# Patient Record
Sex: MALE | Race: WHITE
[De-identification: names, ages, dates, MRNs, and addresses within clinical notes are randomized per-mention and may not be internally consistent; named-entity substitution may affect disease eponyms.]

---

## 2018-04-01 ENCOUNTER — HOSPITAL ENCOUNTER (OUTPATIENT)
Dept: HOSPITAL 47 - EC | Age: 53
Setting detail: OBSERVATION
LOS: 1 days | Discharge: HOME | End: 2018-04-02
Payer: COMMERCIAL

## 2018-04-01 VITALS — BODY MASS INDEX: 25 KG/M2

## 2018-04-01 DIAGNOSIS — L40.9: ICD-10-CM

## 2018-04-01 DIAGNOSIS — M50.93: ICD-10-CM

## 2018-04-01 DIAGNOSIS — K21.9: ICD-10-CM

## 2018-04-01 DIAGNOSIS — Z86.73: ICD-10-CM

## 2018-04-01 DIAGNOSIS — I10: ICD-10-CM

## 2018-04-01 DIAGNOSIS — F17.210: ICD-10-CM

## 2018-04-01 DIAGNOSIS — Z79.899: ICD-10-CM

## 2018-04-01 DIAGNOSIS — B34.9: Primary | ICD-10-CM

## 2018-04-01 DIAGNOSIS — R00.0: ICD-10-CM

## 2018-04-01 LAB
ALBUMIN SERPL-MCNC: 3.9 G/DL (ref 3.5–5)
ALP SERPL-CCNC: 94 U/L (ref 38–126)
ALT SERPL-CCNC: 18 U/L (ref 21–72)
ANION GAP SERPL CALC-SCNC: 11 MMOL/L
APTT BLD: 25.1 SEC (ref 22–30)
AST SERPL-CCNC: 14 U/L (ref 17–59)
BASOPHILS # BLD AUTO: 0 K/UL (ref 0–0.2)
BASOPHILS NFR BLD AUTO: 0 %
BUN SERPL-SCNC: 11 MG/DL (ref 9–20)
CALCIUM SPEC-MCNC: 9.4 MG/DL (ref 8.4–10.2)
CHLORIDE SERPL-SCNC: 105 MMOL/L (ref 98–107)
CK SERPL-CCNC: 26 U/L (ref 55–170)
CO2 SERPL-SCNC: 25 MMOL/L (ref 22–30)
D DIMER PPP FEU-MCNC: 0.21 MG/L FEU (ref ?–0.6)
EOSINOPHIL # BLD AUTO: 0.1 K/UL (ref 0–0.7)
EOSINOPHIL NFR BLD AUTO: 2 %
ERYTHROCYTE [DISTWIDTH] IN BLOOD BY AUTOMATED COUNT: 5.14 M/UL (ref 4.3–5.9)
ERYTHROCYTE [DISTWIDTH] IN BLOOD: 12.4 % (ref 11.5–15.5)
GLUCOSE CSF-MCNC: 62 MG/DL (ref 40–70)
GLUCOSE SERPL-MCNC: 108 MG/DL (ref 74–99)
HCT VFR BLD AUTO: 45.2 % (ref 39–53)
HGB BLD-MCNC: 16.7 GM/DL (ref 13–17.5)
INR PPP: 1.1 (ref ?–1.2)
LYMPHOCYTES # SPEC AUTO: 1.5 K/UL (ref 1–4.8)
LYMPHOCYTES NFR SPEC AUTO: 24 %
MCH RBC QN AUTO: 32.4 PG (ref 25–35)
MCHC RBC AUTO-ENTMCNC: 36.8 G/DL (ref 31–37)
MCV RBC AUTO: 88 FL (ref 80–100)
MONOCYTES # BLD AUTO: 0.6 K/UL (ref 0–1)
MONOCYTES NFR BLD AUTO: 9 %
NEUTROPHILS # BLD AUTO: 3.9 K/UL (ref 1.3–7.7)
NEUTROPHILS NFR BLD AUTO: 63 %
NUC CELL # CSF: 2 U/L (ref 0–5)
PH UR: 6.5 [PH] (ref 5–8)
PLATELET # BLD AUTO: 184 K/UL (ref 150–450)
POTASSIUM SERPL-SCNC: 3.6 MMOL/L (ref 3.5–5.1)
PROT SERPL-MCNC: 6.7 G/DL (ref 6.3–8.2)
PT BLD: 10.4 SEC (ref 9–12)
RBC # CSF: 1 U/L (ref 0–10)
SODIUM SERPL-SCNC: 141 MMOL/L (ref 137–145)
SP GR UR: 1.01 (ref 1–1.03)
SPECIMEN VOL CSF: 2 ML
TROPONIN I SERPL-MCNC: <0.012 NG/ML (ref 0–0.03)
UROBILINOGEN UR QL STRIP: <2 MG/DL (ref ?–2)
WBC # BLD AUTO: 6.3 K/UL (ref 3.8–10.6)

## 2018-04-01 PROCEDURE — 62270 DX LMBR SPI PNXR: CPT

## 2018-04-01 PROCEDURE — 85730 THROMBOPLASTIN TIME PARTIAL: CPT

## 2018-04-01 PROCEDURE — 85025 COMPLETE CBC W/AUTO DIFF WBC: CPT

## 2018-04-01 PROCEDURE — 85610 PROTHROMBIN TIME: CPT

## 2018-04-01 PROCEDURE — 87070 CULTURE OTHR SPECIMN AEROBIC: CPT

## 2018-04-01 PROCEDURE — 87205 SMEAR GRAM STAIN: CPT

## 2018-04-01 PROCEDURE — 84157 ASSAY OF PROTEIN OTHER: CPT

## 2018-04-01 PROCEDURE — 82553 CREATINE MB FRACTION: CPT

## 2018-04-01 PROCEDURE — 80053 COMPREHEN METABOLIC PANEL: CPT

## 2018-04-01 PROCEDURE — 36415 COLL VENOUS BLD VENIPUNCTURE: CPT

## 2018-04-01 PROCEDURE — 89050 BODY FLUID CELL COUNT: CPT

## 2018-04-01 PROCEDURE — 82550 ASSAY OF CK (CPK): CPT

## 2018-04-01 PROCEDURE — 99285 EMERGENCY DEPT VISIT HI MDM: CPT

## 2018-04-01 PROCEDURE — 81003 URINALYSIS AUTO W/O SCOPE: CPT

## 2018-04-01 PROCEDURE — 96365 THER/PROPH/DIAG IV INF INIT: CPT

## 2018-04-01 PROCEDURE — 70450 CT HEAD/BRAIN W/O DYE: CPT

## 2018-04-01 PROCEDURE — 93005 ELECTROCARDIOGRAM TRACING: CPT

## 2018-04-01 PROCEDURE — 84484 ASSAY OF TROPONIN QUANT: CPT

## 2018-04-01 PROCEDURE — 96366 THER/PROPH/DIAG IV INF ADDON: CPT

## 2018-04-01 PROCEDURE — 85379 FIBRIN DEGRADATION QUANT: CPT

## 2018-04-01 PROCEDURE — 96361 HYDRATE IV INFUSION ADD-ON: CPT

## 2018-04-01 PROCEDURE — 82945 GLUCOSE OTHER FLUID: CPT

## 2018-04-01 PROCEDURE — 87040 BLOOD CULTURE FOR BACTERIA: CPT

## 2018-04-01 PROCEDURE — 87502 INFLUENZA DNA AMP PROBE: CPT

## 2018-04-01 PROCEDURE — 96375 TX/PRO/DX INJ NEW DRUG ADDON: CPT

## 2018-04-01 PROCEDURE — 71046 X-RAY EXAM CHEST 2 VIEWS: CPT

## 2018-04-01 RX ADMIN — IBUPROFEN PRN MG: 600 TABLET ORAL at 22:25

## 2018-04-01 RX ADMIN — ATENOLOL SCH MG: 25 TABLET ORAL at 22:26

## 2018-04-01 RX ADMIN — PANTOPRAZOLE SODIUM SCH: 40 TABLET, DELAYED RELEASE ORAL at 19:08

## 2018-04-01 NOTE — P.CNNES
History of Present Illness


Consult date: 04/01/18


Reason for Consult: Patient admitted with headaches and neck pain.


History of Present Illness: 





This patient is a 52-year-old right-handed white male who was brought into the 

emergency room today for evaluation of shortness of breath and body aches.  

Patient apparently over the past 2 weeks has been complaining of increasing 

headache and neck pain symptoms.  He was brought into the emergency room today 

and was evaluated in the ER by Dr. Contreras.  He was sent for a computed 

tomography scan of the brain for further evaluation of this headache symptoms.  

CAT scan of the brain revealed no acute intracranial abnormality.  There was 

incidental finding of a arachnoid cyst in the left middle cranial fossa as well 

as a second arachnoid cyst in the posterior midline cerebellum region.  No 

acute stroke or hemorrhage was noted.  The computed tomography scan of the 

brain did reveal evidence of moderate chronic ethmoid sinus disease.  The 

patient continued to complain of neck pain and headache symptoms mostly 

involving the bifrontal region.  He denied any previous history of neck injury 

or arthritis involving the cervical spine.  For this reason it was decided to 

attempt a lumbar puncture in the ER by Dr. Contreras.  He was unsuccessful.  

Anesthesia was consulted for assistance and they were able to perform a lumbar 

puncture today successfully.  8 mL's of clear colorless spinal fluid was sent 

for analysis.  His spinal fluid results indicated clear colorless fluid with 

RBC count of 1.0 and WBC count of 2.0.  CSF glucose was 62 and CSF total 

protein was 37.  The spinal fluid was negative for any evidence of acute 

bacterial or viral meningitis.  It was recommended the patient to be admitted 

to the hospital for further evaluation of his current symptoms.  He did also 

have evidence of tachycardia in the emergency room with shortness of breath.  

He was started empirically on some antibiotics by the ER physician Dr. Contreras.  

According to the patient he has been having symptoms of a bad cold and flulike 

symptoms for over 2 weeks.  This has been ongoing however there is been no 

evidence of febrile illness associated with these events.  He was evaluated for 

influenza A and influenza B in the emergency room and these test did come back 

negative.  Patient is a  and states he does have some neck pain 

which usually responds to chiropractic therapy.  Apparently recently the 

symptoms have been not as responsive to any manipulation of the neck.  Patient 

denies any neck injury in the past.  We suggest he have a computed tomography 

scan of the cervical spine for further evaluation of cervical disc disease.  

The patient otherwise seems to be doing better after completion of his spinal 

tap earlier today.  As noted the spinal fluid was negative for any evidence of 

acute bacterial or viral meningitis.  His symptoms seem to be more related to 

possible sinusitis.  He is being evaluated in the ENT clinic in the next 2 

weeks.  Patient is now admitted and neurology has been consulted for further 

evaluation and recommendations.





Review of Systems


Constitutional: Denies chills, Denies fever


Eyes: denies blurred vision, denies pain


Ears, nose, mouth and throat: Reports post-nasal drip, Reports sinus pain, 

Reports sinus pressure, Reports vertigo, Denies headache, Denies sore throat


Cardiovascular: Denies chest pain, Denies shortness of breath


Respiratory: Denies cough


Gastrointestinal: Denies abdominal pain, Denies diarrhea, Denies nausea, Denies 

vomiting


Musculoskeletal: Denies myalgias


Integumentary: Denies pruritus, Denies rash


Neurological: Reports headaches, Reports vertigo, Denies numbness, Denies 

weakness


Psychiatric: Denies anxiety, Denies depression


Endocrine: Denies fatigue, Denies weight change





Past Medical History


Past Medical History: Hypertension


Additional Past Medical History / Comment(s): psoriasis


History of Any Multi-Drug Resistant Organisms: None Reported


Past Surgical History: No Surgical Hx Reported


Past Psychological History: No Psychological Hx Reported


Smoking Status: Current every day smoker


Past Alcohol Use History: Occasional


Past Drug Use History: None Reported





Medications and Allergies


 Home Medications











 Medication  Instructions  Recorded  Confirmed  Type


 


Adalimumab [Humira Crohn's] 40 mg SQ Q30D 04/01/18 04/01/18 History


 


Atenolol [Tenormin] 25 mg PO DAILY 04/01/18 04/01/18 History


 


Doxazosin [Cardura] 2 mg PO DAILY 04/01/18 04/01/18 History


 


Omeprazole [PriLOSEC] 20 mg PO BID 04/01/18 04/01/18 History











 Allergies











Allergy/AdvReac Type Severity Reaction Status Date / Time


 


No Known Allergies Allergy   Verified 04/01/18 11:23














Physical Examination





- Vital Signs


Vital Signs: 


 Vital Signs











  Temp Pulse Resp BP Pulse Ox


 


 04/01/18 17:28  98.1 F  97  18  154/91  96


 


 04/01/18 16:00   106 H  18  128/84  95


 


 04/01/18 15:51  98.1 F  106 H  18  124/85  96


 


 04/01/18 15:19   98  16  134/90  98


 


 04/01/18 14:15   110 H  16  158/106  96


 


 04/01/18 12:13   99  16  168/107  98


 


 04/01/18 11:05  97.6 F  131 H  20  134/87  95








 Intake and Output











 04/01/18 04/01/18 04/01/18





 06:59 14:59 22:59


 


Other:   


 


  Weight  74.843 kg 














- Constitutional


General appearance: average body habitus, cooperative





- EENT


EENT: PERRL, mucous membranes moist





- Respiratory


Respiratory: lungs clear, normal breath sounds





- Cardiovascular


Cardiovascular: regular rate, normal S1, normal S2


Extremities: no peripheral edema bilaterally





- Gastrointestinal


Gastrointestinal: normoactive bowel sounds





- Integumentary


Integumentary: normal





- Neurologic


Cranial nerve examination: PERRL, EOMI, VFF, V1/V2/V3 grossly intact, face 

symmetric, tongue midline, intact gag reflex, intact corneal reflex, normal 

palatal elevation


Speech examination: intact


Detailed motor examination: grossly full strength in all extremities


Motor examination - right side: 4/5: biceps, triceps, wrist flexion, wrist 

extension, , hip flexors, knee extensors, dorsiflexion, toe extension (EHL)

, plantarflexion


Motor examination - left side: 4/5: biceps, triceps, wrist flexion, wrist 

extension, , hip flexors, knee extensors, dorsiflexion, toe extension (EHL)

, plantarflexion


Detailed sensory examination: intact


Reflex and gait examination: intact


Reflexes: 1+: ankle, bicep, knee, tricep





- Musculoskeletal


Musculoskeletal: no pain





- Psychiatric


Psychiatric: mood/affect appropriate, cooperative





Results





- Laboratory Findings


CBC and BMP: 


 04/01/18 11:44





 04/01/18 11:44


Abnormal Lab Findings: 


 Abnormal Labs











  04/01/18 04/01/18





  11:44 11:44


 


Glucose   108 H


 


AST   14 L


 


ALT   18 L


 


Total Creatine Kinase  26 L 














Assessment and Plan


(1) Headache


Current Visit: Yes   Status: Acute   Code(s): R51 - HEADACHE   SNOMED Code(s): 

09356197


   





(2) Neck stiffness


Current Visit: Yes   Status: Acute   Code(s): M43.6 - TORTICOLLIS   SNOMED Code(

s): 837413295


   





(3) Cervical disc disease


Current Visit: Yes   Status: Acute   Code(s): M50.90 - CERVICAL DISC DISORDER, 

UNSP, UNSPECIFIED CERVICAL REGION   SNOMED Code(s): 202141866


   





(4) Tachycardia


Current Visit: Yes   Status: Acute   Code(s): R00.0 - TACHYCARDIA, UNSPECIFIED 

  SNOMED Code(s): 4260877


   


Plan: 





This patient is a 52-year-old male who was admitted to the hospital today with 

symptoms of headache and neck pain.  Patient was brought into the emergency 

room today and was seen in the ER by Dr. Contreras.  He was complaining of two-week 

history of neck pain and headaches.  Patient underwent computed tomography scan 

of the brain which revealed no acute intracranial abnormality.  There was 

evidence of 2 arachnoid cyst which appeared to be normal variance.  There was 

moderate degree of chronic ethmoid sinusitis noted.  The patient was seen by 

Dr. Contreras who attempted a lumbar puncture for the patient.  He was 

unsuccessful.  Anesthesia was contacted and spinal fluid was obtained.  Results 

of the spinal fluid were reviewed today with the patient and are negative for 

any evidence for acute bacterial or viral meningitis.  The patient has been 

complaining of mild nasal congestion and sinus type symptoms.  CAT scan does 

show evidence of ethmoid sinusitis.  Patient is awaiting to be seen in the ENT 

clinic in 2 weeks.  He was given empiric dose of antibiotics in the emergency 

room and admitted to Hospital.  His neurological examination at this time is 

nonfocal.  We have recommended he undergo a computed tomography scan of the 

neck for further evaluation of cervical disc disease.  He does not have any 

evidence for cervical radiculopathy at this time.  Patient is also being 

evaluated for sinus tachycardia.  We will continue close neurological follow-up 

with this patient during this admission.  We have reviewed the results of his 

computed tomography scan of the brain and spinal fluid analysis with him in 

detail today.  His overall prognosis remains guarded.


Time with Patient: Greater than 30

## 2018-04-01 NOTE — P.PN
Progress Note - Text


Anesthesia was consulted to perform a lumbar puncture by Dr. Contreras, from the 

emergency room department.  The emergency room physician had attempted to 

perform an LP prior today without success.  The patient is incurring [headache 

and neck stiffness.  The LP was requested to ascertain if there is any 

infectious process occurring in the patient's central nervous system. ].  The 

chart was reviewed.  The procedure, the risks and benefits of the procedure, 

were explained to the patient.  The patient then agreed to the procedure and 

informed consent was obtained.





Procedure: The patient was placed in the sitting position.  The low back was 

then sterilely prepped and draped.  Then [1 ]mL of lidocaine 1% was injected 

subcutaneously at the L3-L4 interspace.  Then a 22-gauge quinke spinal needle 

was placed into the subarachnoid space at L3-L4 on the second attempt without 

difficulty.  Then approximately [8 ]mL's of clear colorless spinal fluid was 

obtained in 4 tubes. [ 2 mL's]/tube.  The patient tolerated the procedure well.

  There were no complications.  Instructions were then given to the patient.  

Patient was to be at bed rest for the next 2 hours.  Patient is encouraged to 

increase oral fluid,if tolerated.  Patient may take previously prescribed pain 

meds if needed.

## 2018-04-01 NOTE — XR
EXAMINATION TYPE: XR chest 2V

 

DATE OF EXAM: 4/1/2018

 

HISTORY: difficulty breathing.

 

REFERENCE: NONE.

 

FINDINGS: Lung volumes are mildly prominent. There is minimal atelectasis at the left lung base. The 
heart is not enlarged. Pleural spaces are clear.

 

IMPRESSION: 

1. MINIMAL, PLATELIKE ATELECTASIS, LEFT LUNG BASE.

2. PLEASE CORRELATE FOR COPD.

## 2018-04-01 NOTE — ED
SOB HPI





- General


Chief Complaint: Shortness of Breath


Stated Complaint: POSS PNEUMONIA


Time Seen by Provider: 04/01/18 11:18


Source: patient, family


Mode of arrival: ambulatory


Limitations: no limitations





- History of Present Illness


Initial Comments: 


52 years old gentleman presents with the body aches, shortness of breath, pain 

with deep breaths, complaining about headache and neck pain for about 2 weeks 

now he denies any trauma also complains about nausea and headache frontal 

sinuses, he denies any history of arthritis in the neck noticed that heart rate 

was faster he noticed some palpitation or abdominal pain no frequency urgency 

dysuria he has been feeling dizzy


History of TIA and no symptoms consistent with a TIA or CVA





- Related Data


 Home Medications











 Medication  Instructions  Recorded  Confirmed


 


Adalimumab [Humira Crohn's] 40 mg SQ Q30D 04/01/18 04/01/18


 


Atenolol [Tenormin] 25 mg PO DAILY 04/01/18 04/01/18


 


Doxazosin [Cardura] 2 mg PO DAILY 04/01/18 04/01/18


 


Omeprazole [PriLOSEC] 20 mg PO BID 04/01/18 04/01/18











 Allergies











Allergy/AdvReac Type Severity Reaction Status Date / Time


 


No Known Allergies Allergy   Verified 04/01/18 11:23














Review of Systems


ROS Statement: 


Those systems with pertinent positive or pertinent negative responses have been 

documented in the HPI.





ROS Other: All systems not noted in ROS Statement are negative.





Past Medical History


Past Medical History: Hypertension


Additional Past Medical History / Comment(s): psoriasis


History of Any Multi-Drug Resistant Organisms: None Reported


Past Surgical History: No Surgical Hx Reported


Past Psychological History: No Psychological Hx Reported


Smoking Status: Current every day smoker


Past Alcohol Use History: Occasional


Past Drug Use History: None Reported





General Exam





- General Exam Comments


Initial Comments: 


General:  The patient is awake and alert, in no distress, and does not appear 

acutely ill.  GCS is 15


Skin:  Skin is warm and dry and no rashes or lesions are noted. 


Eye:  Pupils are equal, round and reactive to light, extra-ocular movements are 

intact; there is normal conjunctiva bilaterally.  


Ears, nose, mouth and throat: Tender over frontal sinuses bilaterally. 


Neck:  The neck is supple, there is no tenderness  or JVD.  


Cardiovascular:  There is a regular rate and rhythm. No murmur, rub or gallop 

is appreciated.


Respiratory: To auscultation bilateral, no wheezing no rhonchi no distress  

respiratory wise noticed


Gastrointestinal:  Soft, non-distended, non-tender abdomen without masses or 

organomegaly noted. There is no rebound or guarding present. Bowel sounds are 

unremarkable. 


Back:  There is no tenderness to palpation in the midline. There is no obvious 

deformity.


Musculoskeletal:  Normal ROM, no tenderness, There is no pedal edema. There is 

no calf tenderness or swelling. No cords were appreciated.  


Neurological:  CN II-XII intact, Cranial nerves III through XII are intact. 

There are no obvious motor or sensory deficits. Coordination appears grossly 

intact. Speech is normal.


Psychiatric:  Cooperative, appropriate mood & affect, normal judgment.  








Limitations: no limitations





Course


 Vital Signs











  04/01/18 04/01/18 04/01/18





  11:05 12:13 14:15


 


Temperature 97.6 F  


 


Pulse Rate 131 H 99 110 H


 


Respiratory 20 16 16





Rate   


 


Blood Pressure 134/87 168/107 158/106


 


O2 Sat by Pulse 95 98 96





Oximetry   














  04/01/18 04/01/18





  15:19 15:51


 


Temperature  98.1 F


 


Pulse Rate 98 106 H


 


Respiratory 16 18





Rate  


 


Blood Pressure 134/90 124/85


 


O2 Sat by Pulse 98 96





Oximetry  








Patient is reassessed a few times, his CBC, comprehensive metabolic panel, 

troponin, d-dimer, chest x-ray are unremarkable he continued to have a headache 

and neck neck stiffness and dizziness considering his cough and shortness of 

breath was wondering about pneumonia but chest x-ray being clear)


Head CT and considering is a neck stiffness and her do a lumbar puncture.  I 

noticed he was quite tachycardic on arrival his heart rate was around 1:30 he 

does some fluids though there is no clear dehydration visible on his blood work 

and he has no diarrhea hasn't no vomiting his oral intake has been good








EKG is a sinus tachycardia ventricular rate of the time EKG was done 103 DE 

interval is 202 QRS duration is 90 QT/QTc is 352/461 review of this EKG does 

not reveal any ST elevation or ST depression,


Head CT was reviewed, noticed no acute intracranial abnormality, incidental 2.4 

cm arachnoid cyst anterior left middle cranial fossa a second arachnoid cyst 

versus normal variant Megace cisterna magna posterior midline cerebellum 

measures 2.7 cm also noticed some moderate chronic ethmoid sinus disease





- Reevaluation(s)


Reevaluation #1: 


Syrinx patient's headache neck stiffness and dizziness head CT was done which 

is unremarkable he does have an arachnoid cyst time to LP was attempted, I was 

not able to get any fluids because Turning into the bones may be secondary to 

degenerative disease in the lower back or malposition, patient be admitted to 

Dr. Allen's service for further evaluation and management of his tachycardia 

headache will consult neurology for his headache blood cultures and urine 

cultures have been obtained and will continue on a headache him on empiric 

antibiotics and IV fluids


04/01/18 15:54








Medical Decision Making





- Lab Data


Result diagrams: 


 04/01/18 11:44





 04/01/18 11:44


 Lab Results











  04/01/18 04/01/18 04/01/18 Range/Units





  11:44 11:44 11:44 


 


WBC   6.3   (3.8-10.6)  k/uL


 


RBC   5.14   (4.30-5.90)  m/uL


 


Hgb   16.7   (13.0-17.5)  gm/dL


 


Hct   45.2   (39.0-53.0)  %


 


MCV   88.0   (80.0-100.0)  fL


 


MCH   32.4   (25.0-35.0)  pg


 


MCHC   36.8   (31.0-37.0)  g/dL


 


RDW   12.4   (11.5-15.5)  %


 


Plt Count   184   (150-450)  k/uL


 


Neutrophils %   63   %


 


Lymphocytes %   24   %


 


Monocytes %   9   %


 


Eosinophils %   2   %


 


Basophils %   0   %


 


Neutrophils #   3.9   (1.3-7.7)  k/uL


 


Lymphocytes #   1.5   (1.0-4.8)  k/uL


 


Monocytes #   0.6   (0-1.0)  k/uL


 


Eosinophils #   0.1   (0-0.7)  k/uL


 


Basophils #   0.0   (0-0.2)  k/uL


 


PT     (9.0-12.0)  sec


 


INR     (<1.2)  


 


APTT     (22.0-30.0)  sec


 


D-Dimer     (<0.60)  mg/L FEU


 


Sodium    141  (137-145)  mmol/L


 


Potassium    3.6  (3.5-5.1)  mmol/L


 


Chloride    105  ()  mmol/L


 


Carbon Dioxide    25  (22-30)  mmol/L


 


Anion Gap    11  mmol/L


 


BUN    11  (9-20)  mg/dL


 


Creatinine    0.99  (0.66-1.25)  mg/dL


 


Est GFR (CKD-EPI)AfAm    >90  (>60 ml/min/1.73 sqM)  


 


Est GFR (CKD-EPI)NonAf    86  (>60 ml/min/1.73 sqM)  


 


Glucose    108 H  (74-99)  mg/dL


 


Calcium    9.4  (8.4-10.2)  mg/dL


 


Total Bilirubin    0.7  (0.2-1.3)  mg/dL


 


AST    14 L  (17-59)  U/L


 


ALT    18 L  (21-72)  U/L


 


Alkaline Phosphatase    94  ()  U/L


 


Total Creatine Kinase  26 L    ()  U/L


 


CK-MB (CK-2)  <0.2    (0.0-2.4)  ng/mL


 


CK-MB (CK-2) Rel Index      


 


Troponin I  <0.012    (0.000-0.034)  ng/mL


 


Total Protein    6.7  (6.3-8.2)  g/dL


 


Albumin    3.9  (3.5-5.0)  g/dL


 


Urine Color     


 


Urine Appearance     (Clear)  


 


Urine pH     (5.0-8.0)  


 


Ur Specific Gravity     (1.001-1.035)  


 


Urine Protein     (Negative)  


 


Urine Glucose (UA)     (Negative)  


 


Urine Ketones     (Negative)  


 


Urine Blood     (Negative)  


 


Urine Nitrite     (Negative)  


 


Urine Bilirubin     (Negative)  


 


Urine Urobilinogen     (<2.0)  mg/dL


 


Ur Leukocyte Esterase     (Negative)  


 


Influenza Type A RNA     (Not Detectd)  


 


Influenza Type B (PCR)     (Not Detectd)  














  04/01/18 04/01/18 04/01/18 Range/Units





  11:44 11:44 12:50 


 


WBC     (3.8-10.6)  k/uL


 


RBC     (4.30-5.90)  m/uL


 


Hgb     (13.0-17.5)  gm/dL


 


Hct     (39.0-53.0)  %


 


MCV     (80.0-100.0)  fL


 


MCH     (25.0-35.0)  pg


 


MCHC     (31.0-37.0)  g/dL


 


RDW     (11.5-15.5)  %


 


Plt Count     (150-450)  k/uL


 


Neutrophils %     %


 


Lymphocytes %     %


 


Monocytes %     %


 


Eosinophils %     %


 


Basophils %     %


 


Neutrophils #     (1.3-7.7)  k/uL


 


Lymphocytes #     (1.0-4.8)  k/uL


 


Monocytes #     (0-1.0)  k/uL


 


Eosinophils #     (0-0.7)  k/uL


 


Basophils #     (0-0.2)  k/uL


 


PT  10.4    (9.0-12.0)  sec


 


INR  1.1    (<1.2)  


 


APTT  25.1    (22.0-30.0)  sec


 


D-Dimer  0.21    (<0.60)  mg/L FEU


 


Sodium     (137-145)  mmol/L


 


Potassium     (3.5-5.1)  mmol/L


 


Chloride     ()  mmol/L


 


Carbon Dioxide     (22-30)  mmol/L


 


Anion Gap     mmol/L


 


BUN     (9-20)  mg/dL


 


Creatinine     (0.66-1.25)  mg/dL


 


Est GFR (CKD-EPI)AfAm     (>60 ml/min/1.73 sqM)  


 


Est GFR (CKD-EPI)NonAf     (>60 ml/min/1.73 sqM)  


 


Glucose     (74-99)  mg/dL


 


Calcium     (8.4-10.2)  mg/dL


 


Total Bilirubin     (0.2-1.3)  mg/dL


 


AST     (17-59)  U/L


 


ALT     (21-72)  U/L


 


Alkaline Phosphatase     ()  U/L


 


Total Creatine Kinase     ()  U/L


 


CK-MB (CK-2)     (0.0-2.4)  ng/mL


 


CK-MB (CK-2) Rel Index     


 


Troponin I     (0.000-0.034)  ng/mL


 


Total Protein     (6.3-8.2)  g/dL


 


Albumin     (3.5-5.0)  g/dL


 


Urine Color    Light Yellow  


 


Urine Appearance    Clear  (Clear)  


 


Urine pH    6.5  (5.0-8.0)  


 


Ur Specific Gravity    1.008  (1.001-1.035)  


 


Urine Protein    Negative  (Negative)  


 


Urine Glucose (UA)    Negative  (Negative)  


 


Urine Ketones    Negative  (Negative)  


 


Urine Blood    Negative  (Negative)  


 


Urine Nitrite    Negative  (Negative)  


 


Urine Bilirubin    Negative  (Negative)  


 


Urine Urobilinogen    <2.0  (<2.0)  mg/dL


 


Ur Leukocyte Esterase    Negative  (Negative)  


 


Influenza Type A RNA   Not Detected   (Not Detectd)  


 


Influenza Type B (PCR)   Not Detected   (Not Detectd)  














Disposition


Clinical Impression: 


 Tachycardia, Headache, Neck stiffness, Weakness





Disposition: ADMITTED AS IP TO THIS [facility]


Condition: Good


Referrals: 


Yana George DO [Primary Care Provider] - 1-2 days

## 2018-04-01 NOTE — CT
EXAMINATION TYPE: CT brain wo con

 

DATE OF EXAM: 4/1/2018

 

COMPARISON: NONE

 

HISTORY: 52-year-old male with headache and dizziness.

 

TECHNIQUE:  Examination was done in axial plane without intravenous contrast.  Coronal and sagittal r
econstructions performed.

 

CT DLP: 1079 mGycm

Automated exposure control for dose reduction was used.

 

FINDINGS:

There is no evidence of  acute intracranial hemorrhage, acute ischemic changes, mass-effect, or extra
-axial fluid collection. 

 

Prominent CSF space compatible with an arachnoid cyst along the anterior left middle cranial fossa me
asuring 3.4 x 1.9 cm. Second prominent CSF space along the posterior midline of the posterior cranial
 fossa measures 2.7 x 2.3 cm and could could represent a meredith cisterna magna or second arachnoid cyst
.

 

 There is no effacement of cerebral sulci or basal subarachnoid cisterns.  There is no hydrocephalus.
  There is no midline shift.  Gray-white matter distinction is preserved.

 

Moderate mucosal thickening throughout the ethmoid air cells. Mastoid air cells well pneumatized. Orb
its and globes appear intact.

 

 

IMPRESSION:

 

1. No acute intracranial abnormality seen.

2. Incidental 3.4 cm arachnoid cyst anterior left middle cranial fossa. A second arachnoid cyst versu
s normal variant megacisterna magna posterior midline cerebellum measuring 2.7 cm.

3. Moderate chronic ethmoid sinus disease.

## 2018-04-02 VITALS — HEART RATE: 89 BPM | RESPIRATION RATE: 17 BRPM

## 2018-04-02 VITALS — DIASTOLIC BLOOD PRESSURE: 84 MMHG | TEMPERATURE: 97.7 F | SYSTOLIC BLOOD PRESSURE: 128 MMHG

## 2018-04-02 RX ADMIN — IBUPROFEN PRN MG: 600 TABLET ORAL at 06:35

## 2018-04-02 RX ADMIN — PANTOPRAZOLE SODIUM SCH MG: 40 TABLET, DELAYED RELEASE ORAL at 06:35

## 2018-04-02 RX ADMIN — ATENOLOL SCH MG: 25 TABLET ORAL at 08:36

## 2018-04-02 NOTE — CONS
CONSULTATION



DATE OF SERVICE:

04/02/2018



REASON FOR CONSULTATION:

Antibiotic recommendation.



HISTORY OF PRESENT ILLNESS:

The patient is a 52-year-old  male presenting to the ER at [facility] with the chief complaints of generalized body aches, nasal congestion and some

headache. His symptoms have been going on since Monday. The patient says that he went

to see his primary care physician for a regular checkup. Headache intensity has been

mild to moderate.  No associated photophobia or vomiting.  No chest pain.  Minimal

shortness of breath with associated mild cough but not bringing up any sputum.  No

abdominal pain.  No diarrhea.  With these symptoms, the patient was evaluated by the ER

physician. The patient did have a CT of the brain that was negative for any bleed.

Chest x-ray report showed minimal plate-like atelectasis, left lung base, and COPD. The

patient had no fever during this admission.  The patient's white count is normal at

6.3.  His liver enzymes are on the low side.  His urine has been negative.  The patient

did have a CSF examination which shows normal glucose, protein. White count was only 2.

Influenza A and B have been negative.  I was asked to see the patient for further

recommendation regarding antibiotic therapy.



REVIEW OF SYSTEMS:

CONSTITUTIONAL: Positive for weakness. No high-grade fever.

EYES: No complaint.

ENT: As per HPI.

RESPIRATORY: As per HPI.

CARDIOVASCULAR: No complaint.

GENITOURINARY: No complaint.

GASTROINTESTINAL: No complaint.

MUSCULOSKELETAL:  No complaint.

INTEGUMENTARY: No complaint.

PSYCHOLOGICAL: No complaint.

ENDOCRINE: No complaint.

NEUROLOGICAL: Headache, as mentioned above. No other neurological symptoms.



PAST MEDICAL HISTORY:

Significant for psoriasis and hypertension.



PAST SURGICAL HISTORY:

No major surgeries.



SOCIAL HISTORY:

Current everyday smoker; smokes about a pack a day. Occasionally drinks. No drug use.



FAMILY HISTORY:

No pertinent findings noticed.



ALLERGIES:

NO KNOWN DRUG ALLERGIES.



CURRENT MEDICATIONS:

1. Tenormin.

2. Cardura.

3. Motrin.

4. Narcan.

5. Protonix.



PHYSICAL EXAMINATION:

Blood pressure is 128/84 with a pulse of 78, temperature 97.7. He is 98% on room air.

General description is a middle-aged male lying in bed in no distress.  No tachypnea or

accessory muscle of respiration use.

HEENT examination shows no pallor or scleral icterus.  Oral mucosa membrane is moist.

Minimal _____ erythema. No thrush.

NECK: Trachea is central. No thyromegaly.

LUNGS: Unlabored breathing. Decreased breath sounds at the bases.  No wheeze or

crackle.

HEART: S1, S2.  Regular rate and rhythm.

ABDOMEN:  Soft. No tenderness.  No guarding or rigidity.  No organomegaly.

EXTREMITIES:  No edema of feet.

SKIN EXAMINATION: No rash or mass palpable.

Neurologically patient is awake, alert and oriented x3, with no signs of meningeal

irritation.



LABS:

Hemoglobin is 16.7, white count 6.3 with no left shift.  BUN of 11, creatinine 0.99.

Electrolytes have been normal. Liver enzymes are normal. UA has been negative. _____

has been negative.



DIAGNOSTIC IMPRESSION AND PLAN:

Patient admitted to hospital with generalized body aches, some nasal congestion,

minimal cough, more likely pointing towards a viral syndrome.  Clinically doubt a

bacterial infection in a patient who did have absence of any fever and no elevated

white count.  He did have extensive workup, including UA that was negative,  chest x-

ray report negative for pneumonia; even the lumbar puncture CSF examination now

pointing towards a bacterial or viral infection.



PLAN:

1. No need for any systemic antibiotic therapy at this point. This was explained in

    detail to the patient and his wife, and all their questions were answered. Also

    discussed with the nurse practitioner for the admitting team.

2. Patient may benefit from some nasal decongestant and Tylenol for his underlying

    sinus congestion symptoms.





MMODL / IJN: [id number] / Job#: [id number]

## 2018-04-03 NOTE — P.HPIM
History of Present Illness


H&P Date: 04/02/18


Chief Complaint: Headache





HISTORY AND PHYSICAL AND DISCHARGE SUMMARY: 








This is a 52-year-old  male patient of Dr. Lomeli with past medical 

history for hypertension and psoriasis on Humira, gastroesophageal reflux 

disease.


Patient presented to [facility] emergency center due to 

shortness of breath and pain with deep breathing along with headache and neck 

pain for 2 weeks.  No trauma to the area.  He also had nausea and headache to 

the frontal sinus areas.  He also noted that he was having palpitations.  He 

was found to be tachycardic running between 98 and 131.  He was afebrile.  

Blood pressure was high at 168/107.  His laboratory studies were essentially 

normal including a normal troponin is 0.012.  D-dimer was normal, influenza 

testing was negative and urinalysis was negative.  EKG was a sinus tachycardia 

with no acute ST changes.  Chest x-ray showed minimal platelike atelectasis in 

the left lung base.  Correlate for COPD.  CT of the brain showed no acute 

intracranial abnormality.  Incidental arachnoid cyst in the anterior left 

middle cranial fossa.  A second arachnoid cyst versus normal variant 

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 

chronic ethmoid sinus disease.  Patient was seen by anesthesia and underwent a 

lumbar puncture.  He was seen by neurology, Dr. Abreu with recommendations to 

follow-up with ENT clinic as was previously planned and he also recommended a 

CAT scan of the neck for further evaluation of cervical disc disease which can 

be done as an outpatient.  He was also seen by Dr. Contreras from infectious 

disease with no need for any antibiotics.  Spinal fluid was negative for 

infection and Gram stain showed no organisms, no PMNs.  Blood culture showed no 

growth at 24 hours. Patient's blood pressure improved and he remained afebrile.

  Patient was very anxious to be discharged and he was discharged home today in 

stable condition.





Review of Systems


Constitutional: Denies chills, Denies fever, Denies poor appetite


Ears, nose, mouth and throat: Reports headache, Reports nasal congestion, 

Denies dental pain, Denies dysphagia, Denies hoarseness, Denies mouth pain, 

Denies swelling in mouth, Denies swelling in throat, Denies sore throat


Cardiovascular: Reports chest pain, Reports shortness of breath, Denies 

decreased exercise tolerance, Denies dyspnea on exertion, Denies edema, Denies 

leg edema, Denies lightheadedness, Denies orthopnea, Denies syncope


Respiratory: Reports cough, Reports dyspnea, Denies congestion, Denies cough 

with sputum, Denies excessive sputum, Denies hemoptysis, Denies home oxygen, 

Denies wheezing


Gastrointestinal: Denies abdominal pain, Denies diarrhea, Denies loss of 

appetite, Denies melena, Denies nausea, Denies vomiting


Genitourinary: Denies dysuria


Musculoskeletal: Reports neck pain, Denies frequent falls, Denies gait 

dysfunction


Integumentary: Denies wounds


Neurological: Denies change in speech, Denies confusion, Denies gait dysfunction

, Denies loss of vision, Denies seizures





Past Medical History


Past Medical History: GERD/Reflux, Hypertension


Additional Past Medical History / Comment(s): psoriasis


History of Any Multi-Drug Resistant Organisms: None Reported


Past Surgical History: No Surgical Hx Reported


Past Psychological History: No Psychological Hx Reported


Smoking Status: Current every day smoker


Past Alcohol Use History: Occasional


Additional Past Alcohol Use History / Comment(s): Patient is smoker of less 

than one pack per day for 30 years.  He denies any medical marijuana, street 

drug or alcohol use.  He lives at home with his wife.


Past Drug Use History: None Reported





- Past Family History


  ** Father


Additional Family Medical History / Comment(s): Patient denies any family 

history of heart disease, diabetes, strokes, cancers.





Medications and Allergies


 Home Medications











 Medication  Instructions  Recorded  Confirmed  Type


 


Adalimumab [Humira Pen Crohn-Uc-Hs 40 mg SQ Q30D 04/01/18 04/01/18 History





Starter]    


 


Atenolol [Tenormin] 25 mg PO DAILY 04/01/18 04/01/18 History


 


Doxazosin [Cardura] 2 mg PO DAILY 04/01/18 04/01/18 History


 


Omeprazole [PriLOSEC] 20 mg PO BID 04/01/18 04/01/18 History


 


guaiFENesin [Mucinex] 600 mg PO Q12HR #30 tablet.er 04/02/18  Rx











 Allergies











Allergy/AdvReac Type Severity Reaction Status Date / Time


 


No Known Allergies Allergy   Verified 04/01/18 11:23














Physical Exam


Vitals: 


 Vital Signs











  Temp Pulse Pulse Pulse Resp BP BP


 


 04/02/18 11:21  97.7 F   78  89  16  


 


 04/02/18 08:36  97.6 F   84  89  16  


 


 04/02/18 04:00  97.1 F L   71  89  17  


 


 04/02/18 00:00    84  88  15  


 


 04/01/18 20:00  97.6 F   113 H  119 H  16   144/107


 


 04/01/18 18:43  97 F L    101 H  17   162/111


 


 04/01/18 17:28  98.1 F  97    18  154/91 


 


 04/01/18 16:00   106 H    18  128/84 


 


 04/01/18 15:51  98.1 F  106 H    18  124/85 


 


 04/01/18 15:19   98    16  134/90 


 


 04/01/18 14:15   110 H    16  158/106 


 


 04/01/18 12:13   99    16  168/107 














  BP Pulse Ox


 


 04/02/18 11:21  128/84  98


 


 04/02/18 08:36  134/88  100


 


 04/02/18 04:00  157/94  96


 


 04/02/18 00:00  145/91  94 L


 


 04/01/18 20:00  145/78  96


 


 04/01/18 18:43   96


 


 04/01/18 17:28   96


 


 04/01/18 16:00   95


 


 04/01/18 15:51   96


 


 04/01/18 15:19   98


 


 04/01/18 14:15   96


 


 04/01/18 12:13   98








 Intake and Output











 04/01/18 04/02/18 04/02/18





 22:59 06:59 14:59


 


Intake Total 100  240


 


Balance 100  240


 


Intake:   


 


  Intake, IV Titration 100  





  Amount   


 


    Sodium Chloride 0.9% 1, 100  





    000 ml @ 100 mls/hr IV .   





    Q10H STA Rx#:[id number]   


 


  Oral   240


 


Other:   


 


  Voiding Method Toilet Toilet Toilet


 


  # Voids 1 3 


 


  Weight 74.843 kg 79.2 kg 

















Gen: This is a 52-year-old  male patient.  He is sitting in a chair at 

the bedside and appears to be in no acute distress.  No respiratory distress is 

noted.


HEENT: Head is atraumatic, normocephalic. Pupils equal, round. Sclerae is 

anicteric.  Conjunctiva pink.  Mucous members of the mouth are moist.  No 

erythema edema of the oral pharynx.  No exudate noted.


NECK: Supple. No JVD. No lymphadenopathy. No thyromegaly. 


LUNGS: Clear to auscultation. No wheezes or rhonchi.  No intercostal 

retractions.


HEART: Regular rate and rhythm. No murmur. 


ABDOMEN: Soft. Bowel sounds are present. No masses.  No tenderness.


EXTREMITIES: No pedal edema.  No calf tenderness.  Dorsalis pedis +2 

bilaterally.


NEUROLOGICAL: Patient is awake, alert and oriented x3. Cranial nerves 2 through 

12 are grossly intact. 








Results


CBC & Chem 7: 


 04/01/18 11:44





 04/01/18 11:44


Labs: 


 Abnormal Lab Results - Last 24 Hours (Table)











  04/01/18 04/01/18 Range/Units





  11:44 11:44 


 


Glucose   108 H  (74-99)  mg/dL


 


AST   14 L  (17-59)  U/L


 


ALT   18 L  (21-72)  U/L


 


Total Creatine Kinase  26 L   ()  U/L








 Microbiology - Last 24 Hours (Table)











 04/01/18 17:15 CSF Gram Stain - Preliminary





 Cerebral Spinal Fluid CSF Culture - Preliminary














Thrombosis Risk Factor Assmnt





- DVT/VTE Prophylaxis


DVT/VTE Prophylaxis: Mechanical Prophylaxis ordered





- Choose All That Apply


Any of the Below Risk Factors Present?: Yes


Each Factor Represents 1 point: Age 41-60 years


Other Risk Factors: No


Thrombosis Risk Factor Assessment Total Risk Factor Score: 1


Thrombosis Risk Factor Assessment Level: Low Risk





Assessment and Plan


Plan: 





1.  Viral syndrome.  All testing negative for viral or bacterial meningitis.  

Patient has been seen by Dr. Contreras with no need for antibiotics.  Mucinex will 

be recommended.





2.  Hypertension, continue atenolol, Cardura.





3.  Psoriasis, on Humira.





4.  Gastroesophageal reflux disease, omeprazole.





Patient placed as an observation stay.


Discharge plan: Return home


Impression and plan of care have been directed as dictated by the signing 

physician.  Vane Peralta nurse practitioner acting as scribe for signing 

physician.

## 2022-04-25 NOTE — PROCEDURE: MIPS QUALITY
Quality 130: Documentation Of Current Medications In The Medical Record: Current Medications Documented
Quality 110: Preventive Care And Screening: Influenza Immunization: Influenza Immunization Administered during Influenza season
Quality 226: Preventive Care And Screening: Tobacco Use: Screening And Cessation Intervention: Patient screened for tobacco use, is a smoker AND received Cessation Counseling
Detail Level: Detailed
Quality 431: Preventive Care And Screening: Unhealthy Alcohol Use - Screening: Patient not identified as an unhealthy alcohol user when screened for unhealthy alcohol use using a systematic screening method

## 2023-05-01 ENCOUNTER — APPOINTMENT (OUTPATIENT)
Dept: URBAN - METROPOLITAN AREA CLINIC 234 | Age: 58
Setting detail: DERMATOLOGY
End: 2023-05-01

## 2023-05-01 VITALS — WEIGHT: 170 LBS | HEIGHT: 68 IN

## 2023-05-01 DIAGNOSIS — L40.0 PSORIASIS VULGARIS: ICD-10-CM

## 2023-05-01 PROCEDURE — OTHER ORDER TESTS: OTHER

## 2023-05-01 PROCEDURE — OTHER COUNSELING: OTHER

## 2023-05-01 PROCEDURE — 99214 OFFICE O/P EST MOD 30 MIN: CPT

## 2023-05-01 PROCEDURE — OTHER MIPS QUALITY: OTHER

## 2023-05-01 PROCEDURE — OTHER PRESCRIPTION: OTHER

## 2023-05-01 RX ORDER — GUSELKUMAB 100 MG/ML
INJECTION SUBCUTANEOUS
Qty: 1 | Refills: 6 | Status: ERX | COMMUNITY
Start: 2023-05-01

## 2023-05-01 ASSESSMENT — LOCATION SIMPLE DESCRIPTION DERM
LOCATION SIMPLE: LEFT UPPER ARM
LOCATION SIMPLE: LEFT UPPER ARM

## 2023-05-01 ASSESSMENT — LOCATION ZONE DERM
LOCATION ZONE: ARM
LOCATION ZONE: ARM

## 2023-05-01 ASSESSMENT — LOCATION DETAILED DESCRIPTION DERM
LOCATION DETAILED: LEFT ANTERIOR DISTAL UPPER ARM
LOCATION DETAILED: LEFT ANTERIOR DISTAL UPPER ARM

## 2023-05-01 NOTE — PROCEDURE: ORDER TESTS
Expected Date Of Service: 05/01/2023
Billing Type: Third-Party Bill
Performing Laboratory: 0
Bill For Surgical Tray: no

## 2023-11-02 ENCOUNTER — APPOINTMENT (OUTPATIENT)
Dept: URBAN - METROPOLITAN AREA CLINIC 234 | Age: 58
Setting detail: DERMATOLOGY
End: 2023-11-02

## 2023-11-02 DIAGNOSIS — Z79.899 OTHER LONG TERM (CURRENT) DRUG THERAPY: ICD-10-CM

## 2023-11-02 DIAGNOSIS — L40.0 PSORIASIS VULGARIS: ICD-10-CM

## 2023-11-02 PROCEDURE — OTHER PRESCRIPTION: OTHER

## 2023-11-02 PROCEDURE — OTHER HIGH RISK MEDICATION MONITORING: OTHER

## 2023-11-02 PROCEDURE — OTHER MIPS QUALITY: OTHER

## 2023-11-02 PROCEDURE — OTHER COUNSELING: OTHER

## 2023-11-02 PROCEDURE — 99214 OFFICE O/P EST MOD 30 MIN: CPT

## 2023-11-02 PROCEDURE — OTHER ORDER TESTS: OTHER

## 2023-11-02 RX ORDER — GUSELKUMAB 100 MG/ML
INJECTION SUBCUTANEOUS
Qty: 1 | Refills: 3 | Status: ERX

## 2023-11-02 NOTE — PROCEDURE: ORDER TESTS
Expected Date Of Service: 11/02/2023
Bill For Surgical Tray: no
Performing Laboratory: 0
Billing Type: Third-Party Bill

## 2023-12-06 ENCOUNTER — APPOINTMENT (OUTPATIENT)
Dept: URBAN - METROPOLITAN AREA CLINIC 233 | Age: 58
Setting detail: DERMATOLOGY
End: 2023-12-06

## 2023-12-06 DIAGNOSIS — L40.0 PSORIASIS VULGARIS: ICD-10-CM

## 2023-12-06 PROCEDURE — OTHER ORDER TESTS: OTHER

## 2023-12-06 NOTE — PROCEDURE: ORDER TESTS
Expected Date Of Service: 12/06/2023
Performing Laboratory: 0
Bill For Surgical Tray: no
Billing Type: Third-Party Bill

## 2023-12-14 ENCOUNTER — APPOINTMENT (OUTPATIENT)
Dept: URBAN - METROPOLITAN AREA CLINIC 234 | Age: 58
Setting detail: DERMATOLOGY
End: 2023-12-14

## 2023-12-14 DIAGNOSIS — L72.0 EPIDERMAL CYST: ICD-10-CM

## 2023-12-14 DIAGNOSIS — M71 OTHER BURSOPATHIES: ICD-10-CM

## 2023-12-14 PROBLEM — M71.371 OTHER BURSAL CYST, RIGHT ANKLE AND FOOT: Status: ACTIVE | Noted: 2023-12-14

## 2023-12-14 PROCEDURE — 99212 OFFICE O/P EST SF 10 MIN: CPT

## 2023-12-14 PROCEDURE — OTHER COUNSELING: OTHER

## 2023-12-14 PROCEDURE — OTHER OBSERVATION: OTHER

## 2023-12-14 PROCEDURE — OTHER ADDITIONAL NOTES: OTHER

## 2023-12-14 PROCEDURE — OTHER OBSERVATION AND MEASURE: OTHER

## 2023-12-14 PROCEDURE — OTHER RECOMMENDATIONS: OTHER

## 2023-12-14 ASSESSMENT — LOCATION ZONE DERM
LOCATION ZONE: TOE
LOCATION ZONE: AXILLAE

## 2023-12-14 ASSESSMENT — LOCATION DETAILED DESCRIPTION DERM
LOCATION DETAILED: RIGHT AXILLARY VAULT
LOCATION DETAILED: RIGHT MEDIAL GREAT TOE

## 2023-12-14 ASSESSMENT — LOCATION SIMPLE DESCRIPTION DERM
LOCATION SIMPLE: RIGHT AXILLARY VAULT
LOCATION SIMPLE: RIGHT GREAT TOE

## 2023-12-14 NOTE — PROCEDURE: ADDITIONAL NOTES
Detail Level: Generalized
Additional Notes: Discussed in office excision vs. outpatient with General surgeon.
Render Risk Assessment In Note?: no

## 2023-12-14 NOTE — PROCEDURE: RECOMMENDATIONS
Render Risk Assessment In Note?: no
Recommendation Override: F/u with Podiatry
Detail Level: Zone
Recommendation Preamble: The following recommendations were made during the visit:
Recommendation Override: Refer to Dr. Leandra Brar

## 2023-12-26 ENCOUNTER — RX ONLY (RX ONLY)
Age: 58
End: 2023-12-26

## 2023-12-26 RX ORDER — GUSELKUMAB 100 MG/ML
INJECTION SUBCUTANEOUS
Qty: 1 | Refills: 3 | Status: CANCELLED
Stop reason: SDUPTHER

## 2024-05-06 ENCOUNTER — APPOINTMENT (OUTPATIENT)
Dept: URBAN - METROPOLITAN AREA CLINIC 234 | Age: 59
Setting detail: DERMATOLOGY
End: 2024-05-06

## 2024-05-06 DIAGNOSIS — L40.0 PSORIASIS VULGARIS: ICD-10-CM

## 2024-05-06 DIAGNOSIS — L72.0 EPIDERMAL CYST: ICD-10-CM

## 2024-05-06 PROCEDURE — OTHER MIPS QUALITY: OTHER

## 2024-05-06 PROCEDURE — OTHER COUNSELING: OTHER

## 2024-05-06 PROCEDURE — OTHER PRESCRIPTION: OTHER

## 2024-05-06 PROCEDURE — OTHER TREMFYA MONITORING: OTHER

## 2024-05-06 PROCEDURE — OTHER PRESCRIPTION MEDICATION MANAGEMENT: OTHER

## 2024-05-06 PROCEDURE — 99213 OFFICE O/P EST LOW 20 MIN: CPT

## 2024-05-06 RX ORDER — GUSELKUMAB 100 MG/ML
INJECTION SUBCUTANEOUS
Qty: 1 | Refills: 3 | Status: ERX

## 2024-05-06 ASSESSMENT — LOCATION ZONE DERM: LOCATION ZONE: NOSE

## 2024-05-06 ASSESSMENT — LOCATION SIMPLE DESCRIPTION DERM: LOCATION SIMPLE: NOSE

## 2024-05-06 ASSESSMENT — BSA PSORIASIS: % BODY COVERED IN PSORIASIS: 1

## 2024-05-06 ASSESSMENT — ITCH NUMERIC RATING SCALE: HOW SEVERE IS YOUR ITCHING?: 1

## 2024-05-06 ASSESSMENT — LOCATION DETAILED DESCRIPTION DERM: LOCATION DETAILED: NASAL DORSUM

## 2024-05-06 NOTE — PROCEDURE: PRESCRIPTION MEDICATION MANAGEMENT
Render In Strict Bullet Format?: No
Detail Level: Zone
Continue Regimen: Tremfya sun q every 2 months

## 2024-08-08 ENCOUNTER — APPOINTMENT (OUTPATIENT)
Dept: URBAN - METROPOLITAN AREA CLINIC 234 | Age: 59
Setting detail: DERMATOLOGY
End: 2024-08-08

## 2024-08-08 DIAGNOSIS — L72.0 EPIDERMAL CYST: ICD-10-CM

## 2024-08-08 DIAGNOSIS — L82.0 INFLAMED SEBORRHEIC KERATOSIS: ICD-10-CM

## 2024-08-08 DIAGNOSIS — L82.1 OTHER SEBORRHEIC KERATOSIS: ICD-10-CM

## 2024-08-08 DIAGNOSIS — L91.8 OTHER HYPERTROPHIC DISORDERS OF THE SKIN: ICD-10-CM

## 2024-08-08 DIAGNOSIS — L57.0 ACTINIC KERATOSIS: ICD-10-CM

## 2024-08-08 PROCEDURE — OTHER REASSURANCE: OTHER

## 2024-08-08 PROCEDURE — OTHER SUNSCREEN RECOMMENDATIONS: OTHER

## 2024-08-08 PROCEDURE — 99213 OFFICE O/P EST LOW 20 MIN: CPT | Mod: 25

## 2024-08-08 PROCEDURE — 17000 DESTRUCT PREMALG LESION: CPT | Mod: 59

## 2024-08-08 PROCEDURE — OTHER COUNSELING: OTHER

## 2024-08-08 PROCEDURE — OTHER LIQUID NITROGEN: OTHER

## 2024-08-08 PROCEDURE — OTHER ADDITIONAL NOTES: OTHER

## 2024-08-08 PROCEDURE — 17110 DESTRUCT B9 LESION 1-14: CPT

## 2024-08-08 ASSESSMENT — LOCATION DETAILED DESCRIPTION DERM
LOCATION DETAILED: LEFT MEDIAL INFERIOR EYELID
LOCATION DETAILED: RIGHT PROXIMAL DORSAL FOREARM
LOCATION DETAILED: INFERIOR THORACIC SPINE
LOCATION DETAILED: LEFT SUPERIOR LATERAL LOWER BACK
LOCATION DETAILED: LEFT PROXIMAL DORSAL FOREARM
LOCATION DETAILED: RIGHT INFERIOR LATERAL NECK
LOCATION DETAILED: RIGHT PROXIMAL RADIAL DORSAL FOREARM
LOCATION DETAILED: EPIGASTRIC SKIN
LOCATION DETAILED: PERIUMBILICAL SKIN

## 2024-08-08 ASSESSMENT — LOCATION ZONE DERM
LOCATION ZONE: ARM
LOCATION ZONE: NECK
LOCATION ZONE: TRUNK
LOCATION ZONE: EYELID

## 2024-08-08 ASSESSMENT — LOCATION SIMPLE DESCRIPTION DERM
LOCATION SIMPLE: LEFT INFERIOR EYELID
LOCATION SIMPLE: LEFT FOREARM
LOCATION SIMPLE: ABDOMEN
LOCATION SIMPLE: RIGHT FOREARM
LOCATION SIMPLE: LEFT LOWER BACK
LOCATION SIMPLE: RIGHT ANTERIOR NECK
LOCATION SIMPLE: UPPER BACK

## 2024-08-08 NOTE — PROCEDURE: ADDITIONAL NOTES
Additional Notes: Pt to see ophthalmology for removal
Detail Level: Generalized
Render Risk Assessment In Note?: no

## 2024-08-08 NOTE — PROCEDURE: LIQUID NITROGEN
Show Aperture Variable?: Yes
Render Note In Bullet Format When Appropriate: No
Post-Care Instructions: I reviewed with the patient in detail post-care instructions. Patient is to wear sunprotection, and avoid picking at any of the treated lesions. Pt may apply Vaseline to crusted or scabbing areas.
Medical Necessity Clause: This procedure was medically necessary because the lesions that were treated were:
Spray Paint Text: The liquid nitrogen was applied to the skin utilizing a spray paint frosting technique.
Medical Necessity Information: It is in your best interest to select a reason for this procedure from the list below. All of these items fulfill various CMS LCD requirements except the new and changing color options.
Detail Level: Detailed
Consent: The patient's consent was obtained including but not limited to risks of crusting, scabbing, blistering, scarring, darker or lighter pigmentary change, recurrence, incomplete removal and infection.
Duration Of Freeze Thaw-Cycle (Seconds): 0

## 2024-11-18 ENCOUNTER — APPOINTMENT (OUTPATIENT)
Dept: URBAN - METROPOLITAN AREA CLINIC 234 | Age: 59
Setting detail: DERMATOLOGY
End: 2024-11-18

## 2024-11-18 DIAGNOSIS — L40.0 PSORIASIS VULGARIS: ICD-10-CM

## 2024-11-18 DIAGNOSIS — L72.0 EPIDERMAL CYST: ICD-10-CM

## 2024-11-18 PROCEDURE — 99213 OFFICE O/P EST LOW 20 MIN: CPT

## 2024-11-18 PROCEDURE — OTHER COUNSELING: OTHER

## 2024-11-18 PROCEDURE — OTHER ORDER TESTS: OTHER

## 2024-11-18 PROCEDURE — OTHER TREMFYA MONITORING: OTHER

## 2024-11-18 PROCEDURE — OTHER MIPS QUALITY: OTHER

## 2024-11-18 PROCEDURE — OTHER TREATMENT REGIMEN: OTHER

## 2024-11-18 PROCEDURE — OTHER PRESCRIPTION MEDICATION MANAGEMENT: OTHER

## 2024-11-18 PROCEDURE — OTHER PRESCRIPTION: OTHER

## 2024-11-18 RX ORDER — GUSELKUMAB 100 MG/ML
INJECTION SUBCUTANEOUS
Qty: 1 | Refills: 3 | Status: ACTIVE

## 2024-11-18 ASSESSMENT — LOCATION SIMPLE DESCRIPTION DERM: LOCATION SIMPLE: NOSE

## 2024-11-18 ASSESSMENT — LOCATION DETAILED DESCRIPTION DERM: LOCATION DETAILED: NASAL DORSUM

## 2024-11-18 ASSESSMENT — ITCH NUMERIC RATING SCALE: HOW SEVERE IS YOUR ITCHING?: 0

## 2024-11-18 ASSESSMENT — PGA PSORIASIS: PGA PSORIASIS 2020: ALMOST CLEAR

## 2024-11-18 ASSESSMENT — BSA PSORIASIS: % BODY COVERED IN PSORIASIS: 1

## 2024-11-18 ASSESSMENT — LOCATION ZONE DERM: LOCATION ZONE: NOSE
